# Patient Record
Sex: MALE | Race: BLACK OR AFRICAN AMERICAN
[De-identification: names, ages, dates, MRNs, and addresses within clinical notes are randomized per-mention and may not be internally consistent; named-entity substitution may affect disease eponyms.]

---

## 2021-12-08 NOTE — ED
General Adult HPI





- General


Chief complaint: Recheck/Abnormal Lab/Rx


Stated complaint: Medication Refill


Time Seen by Provider: 12/08/21 15:34


Source: patient, RN notes reviewed


Mode of arrival: ambulatory


Limitations: no limitations





- History of Present Illness


Initial comments: 





66-year-old male history of hypertension, CHF presenting for medication refill. 

Patient has no complaints.  He has been out of his medications for approximately

one week.  The 2 medications that he requires are amlodipine and lisinopril.  He

does take multiple other medications but has a one-month supply of these.  He 

contacted his primary care physician who is in [place] and they indicated that 

he needed to be seen before he could have refills.  He is planning on esta

blishing with a primary care in this area.  He has no other complaints, he is 

requesting a refill of his lisinopril and amlodipine.





- Related Data


                                  Previous Rx's











 Medication  Instructions  Recorded


 


Cyclobenzaprine [Flexeril] 5 mg PO HS #3 tab 12/08/21


 


amLODIPine [Norvasc] 5 mg PO DAILY #30 tab 12/08/21


 


lisinopriL [Zestril] 40 mg PO DAILY #30 tablet 12/08/21











                                    Allergies











Allergy/AdvReac Type Severity Reaction Status Date / Time


 


morphine Allergy  Unknown Verified 12/08/21 15:28














Review of Systems


ROS Statement: 


Those systems with pertinent positive or pertinent negative responses have been 

documented in the HPI.





ROS Other: All systems not noted in ROS Statement are negative.





Past Medical History


Past Medical History: Hypertension


Additional Past Medical History / Comment(s): hypoglycemia, hepatitis C,


History of Any Multi-Drug Resistant Organisms: None Reported


Past Surgical History: Bariatric Surgery


Past Psychological History: No Psychological Hx Reported


Smoking Status: Former smoker


Past Alcohol Use History: Occasional


Past Drug Use History: None Reported





General Exam


Limitations: no limitations


General appearance: alert, in no apparent distress


Head exam: Present: atraumatic, normocephalic


Eye exam: Present: normal appearance, PERRL


ENT exam: Present: normal exam


Neck exam: Present: normal inspection.  Absent: tenderness, meningismus


Respiratory exam: Present: normal lung sounds bilaterally.  Absent: respiratory 

distress, wheezes


Cardiovascular Exam: Present: regular rate, normal rhythm


GI/Abdominal exam: Present: soft.  Absent: distended, tenderness, guarding


Extremities exam: Present: normal inspection, normal capillary refill.  Absent: 

pedal edema


Neurological exam: Present: alert, oriented X3, CN II-XII intact.  Absent: motor

 sensory deficit


Psychiatric exam: Present: normal affect, normal mood


Skin exam: Present: warm, dry, intact.  Absent: cyanosis, diaphoretic





Course


                                   Vital Signs











  12/08/21





  15:28


 


Temperature 99.6 F


 


Pulse Rate 92


 


Respiratory 20





Rate 


 


Blood Pressure 139/61


 


O2 Sat by Pulse 99





Oximetry 














Medical Decision Making





- Medical Decision Making





66-year-old male fell complaints, stable vitals, refilled his medications for 30

 days supply including amlodipine and lisinopril.  Additionally the patient is 

referred to multiple primary care physicians in the area helping him establish





Disposition


Clinical Impression: 


 Encounter for medication refill





Disposition: HOME SELF-CARE


Condition: Good


Instructions (If sedation given, give patient instructions):  Medicine Refill 

(ED)


Prescriptions: 


Cyclobenzaprine [Flexeril] 5 mg PO HS #3 tab


amLODIPine [Norvasc] 5 mg PO DAILY #30 tab


lisinopriL [Zestril] 40 mg PO DAILY #30 tablet


Is patient prescribed a controlled substance at d/c from ED?: No


Referrals: 


None,Stated [Primary Care Provider] - 1-2 days


Torres Hinojosa MD [STAFF PHYSICIAN] - 1-2 days


Lester Barakat MD [REFERRING] - 1-2 days


Davian Payne [STAFF PHYSICIAN] - 1-2 days


Time of Disposition: 15:42

## 2022-02-17 ENCOUNTER — HOSPITAL ENCOUNTER (OUTPATIENT)
Dept: HOSPITAL 47 - LABWHC1 | Age: 67
Discharge: HOME | End: 2022-02-17
Attending: INTERNAL MEDICINE
Payer: MEDICARE

## 2022-02-17 DIAGNOSIS — E78.2: ICD-10-CM

## 2022-02-17 DIAGNOSIS — I50.9: Primary | ICD-10-CM

## 2022-02-17 LAB
ANION GAP SERPL CALC-SCNC: 14.5 MMOL/L (ref 10–18)
BUN SERPL-SCNC: 38.9 MG/DL (ref 9–27)
BUN/CREAT SERPL: 13.41 RATIO (ref 12–20)
CALCIUM SPEC-MCNC: 8.9 MG/DL (ref 8.7–10.3)
CHLORIDE SERPL-SCNC: 100 MMOL/L (ref 96–109)
CHOLEST SERPL-MCNC: 164 MG/DL (ref 0–200)
CO2 SERPL-SCNC: 20.5 MMOL/L (ref 20–27.5)
ERYTHROCYTE [DISTWIDTH] IN BLOOD BY AUTOMATED COUNT: 3.3 X 10*6/UL (ref 4.4–5.6)
ERYTHROCYTE [DISTWIDTH] IN BLOOD: 15.8 % (ref 11.5–14.5)
GLUCOSE SERPL-MCNC: 102 MG/DL (ref 70–110)
HCT VFR BLD AUTO: 28.7 % (ref 39.6–50)
HDLC SERPL-MCNC: 100 MG/DL (ref 40–60)
HGB BLD-MCNC: 8.9 G/DL (ref 13–17)
LDLC SERPL CALC-MCNC: 49.9 MG/DL (ref 0–131)
MCH RBC QN AUTO: 27 PG (ref 27–32)
MCHC RBC AUTO-ENTMCNC: 31 G/DL (ref 32–37)
MCV RBC AUTO: 87 FL (ref 80–97)
NRBC BLD AUTO-RTO: 0 /100 WBCS (ref 0–0)
PLATELET # BLD AUTO: 203 X 10*3/UL (ref 140–440)
POTASSIUM SERPL-SCNC: 4.3 MMOL/L (ref 3.5–5.5)
SODIUM SERPL-SCNC: 135 MMOL/L (ref 135–145)
TRIGL SERPL-MCNC: 70.4 MG/DL (ref 0–149)
VLDLC SERPL CALC-MCNC: 14.08 MG/DL (ref 5–40)
WBC # BLD AUTO: 4.54 X 10*3/UL (ref 4.5–10)

## 2022-02-17 PROCEDURE — 83880 ASSAY OF NATRIURETIC PEPTIDE: CPT

## 2022-02-17 PROCEDURE — 85027 COMPLETE CBC AUTOMATED: CPT

## 2022-02-17 PROCEDURE — 36415 COLL VENOUS BLD VENIPUNCTURE: CPT

## 2022-02-17 PROCEDURE — 84443 ASSAY THYROID STIM HORMONE: CPT

## 2022-02-17 PROCEDURE — 80061 LIPID PANEL: CPT

## 2022-02-17 PROCEDURE — 80048 BASIC METABOLIC PNL TOTAL CA: CPT

## 2022-06-16 ENCOUNTER — HOSPITAL ENCOUNTER (INPATIENT)
Dept: HOSPITAL 47 - EC | Age: 67
LOS: 1 days | Discharge: HOME | DRG: 641 | End: 2022-06-17
Attending: HOSPITALIST | Admitting: HOSPITALIST
Payer: MEDICARE

## 2022-06-16 DIAGNOSIS — Z79.899: ICD-10-CM

## 2022-06-16 DIAGNOSIS — D50.9: ICD-10-CM

## 2022-06-16 DIAGNOSIS — E87.5: Primary | ICD-10-CM

## 2022-06-16 DIAGNOSIS — Z71.6: ICD-10-CM

## 2022-06-16 DIAGNOSIS — N17.9: ICD-10-CM

## 2022-06-16 DIAGNOSIS — N18.4: ICD-10-CM

## 2022-06-16 DIAGNOSIS — E87.1: ICD-10-CM

## 2022-06-16 DIAGNOSIS — Z88.5: ICD-10-CM

## 2022-06-16 DIAGNOSIS — B18.2: ICD-10-CM

## 2022-06-16 DIAGNOSIS — I50.9: ICD-10-CM

## 2022-06-16 DIAGNOSIS — I13.0: ICD-10-CM

## 2022-06-16 DIAGNOSIS — E87.2: ICD-10-CM

## 2022-06-16 LAB
ALBUMIN SERPL-MCNC: 4.2 G/DL (ref 3.5–5)
ALP SERPL-CCNC: 103 U/L (ref 38–126)
ALT SERPL-CCNC: 11 U/L (ref 4–49)
ANION GAP SERPL CALC-SCNC: 5 MMOL/L
AST SERPL-CCNC: 77 U/L (ref 17–59)
BASOPHILS # BLD AUTO: 0 K/UL (ref 0–0.2)
BASOPHILS NFR BLD AUTO: 1 %
BUN SERPL-SCNC: 51 MG/DL (ref 9–20)
CALCIUM SPEC-MCNC: 8.4 MG/DL (ref 8.4–10.2)
CHLORIDE SERPL-SCNC: 112 MMOL/L (ref 98–107)
CO2 SERPL-SCNC: 16 MMOL/L (ref 22–30)
EOSINOPHIL # BLD AUTO: 0.1 K/UL (ref 0–0.7)
EOSINOPHIL NFR BLD AUTO: 2 %
ERYTHROCYTE [DISTWIDTH] IN BLOOD BY AUTOMATED COUNT: 4.14 M/UL (ref 4.3–5.9)
ERYTHROCYTE [DISTWIDTH] IN BLOOD: 15.9 % (ref 11.5–15.5)
GLUCOSE SERPL-MCNC: 88 MG/DL (ref 74–99)
HCT VFR BLD AUTO: 37.4 % (ref 39–53)
HGB BLD-MCNC: 11.4 GM/DL (ref 13–17.5)
LYMPHOCYTES # SPEC AUTO: 1.6 K/UL (ref 1–4.8)
LYMPHOCYTES NFR SPEC AUTO: 33 %
MAGNESIUM SPEC-SCNC: 2.7 MG/DL (ref 1.6–2.3)
MCH RBC QN AUTO: 27.5 PG (ref 25–35)
MCHC RBC AUTO-ENTMCNC: 30.4 G/DL (ref 31–37)
MCV RBC AUTO: 90.4 FL (ref 80–100)
MONOCYTES # BLD AUTO: 0.3 K/UL (ref 0–1)
MONOCYTES NFR BLD AUTO: 5 %
NEUTROPHILS # BLD AUTO: 2.8 K/UL (ref 1.3–7.7)
NEUTROPHILS NFR BLD AUTO: 58 %
PLATELET # BLD AUTO: 197 K/UL (ref 150–450)
POTASSIUM SERPL-SCNC: 6.3 MMOL/L (ref 3.5–5.1)
PROT SERPL-MCNC: 7.4 G/DL (ref 6.3–8.2)
SODIUM SERPL-SCNC: 133 MMOL/L (ref 137–145)
WBC # BLD AUTO: 4.8 K/UL (ref 3.8–10.6)

## 2022-06-16 PROCEDURE — 94640 AIRWAY INHALATION TREATMENT: CPT

## 2022-06-16 PROCEDURE — 96365 THER/PROPH/DIAG IV INF INIT: CPT

## 2022-06-16 PROCEDURE — 84132 ASSAY OF SERUM POTASSIUM: CPT

## 2022-06-16 PROCEDURE — 80053 COMPREHEN METABOLIC PANEL: CPT

## 2022-06-16 PROCEDURE — 85025 COMPLETE CBC W/AUTO DIFF WBC: CPT

## 2022-06-16 PROCEDURE — 36415 COLL VENOUS BLD VENIPUNCTURE: CPT

## 2022-06-16 PROCEDURE — 96375 TX/PRO/DX INJ NEW DRUG ADDON: CPT

## 2022-06-16 PROCEDURE — 83735 ASSAY OF MAGNESIUM: CPT

## 2022-06-16 PROCEDURE — 99285 EMERGENCY DEPT VISIT HI MDM: CPT

## 2022-06-16 PROCEDURE — 51798 US URINE CAPACITY MEASURE: CPT

## 2022-06-16 PROCEDURE — 96366 THER/PROPH/DIAG IV INF ADDON: CPT

## 2022-06-16 PROCEDURE — 93005 ELECTROCARDIOGRAM TRACING: CPT

## 2022-06-16 PROCEDURE — 80048 BASIC METABOLIC PNL TOTAL CA: CPT

## 2022-06-16 NOTE — ED
General Adult HPI





- General


Chief complaint: Recheck/Abnormal Lab/Rx


Stated complaint: Abnormal Labs, Dr CARLEY Carpenter sent pt in


Time Seen by Provider: 06/16/22 18:26


Source: patient


Mode of arrival: ambulatory


Limitations: no limitations





- History of Present Illness


Initial comments: 


Shimon is a pleasant 65yo M with PMH of CHF and CKD. Patient went to Dr Carpenter's

office today to establish care, he was told that his labs were abnormal and he 

was told to come to the hospital.  Patient states that his potassium was high. 

He denies any complaints or symptoms. Has been feeling well. 








- Related Data


                                Home Medications











 Medication  Instructions  Recorded  Confirmed


 


Atorvastatin [Lipitor] 40 mg PO HS 06/16/22 06/16/22


 


Cholecalciferol [Vitamin D3 (125 125 mcg PO DAILY 06/16/22 06/16/22





Mcg = 5000 Iu)]   


 


Cyanocobalamin (Vitamin B-12) 1,000 mcg PO DAILY 06/16/22 06/16/22





[Vitamin B-12]   


 


Ferrous Sulfate [Feosol] 325 mg PO DAILY 06/16/22 06/16/22


 


Furosemide [Lasix] 40 mg PO DAILY 06/16/22 06/16/22


 


Loratadine [Claritin] 10 mg PO DAILY 06/16/22 06/16/22


 


Melatonin [Melatonin ER] 10 mg PO HS 06/16/22 06/16/22


 


Multivit-Min/FA/Lycopen/Lutein 1 tab PO DAILY 06/16/22 06/16/22





[Centrum Silver Men Tablet]   


 


carvediloL [Coreg] 25 mg PO BID-W/MEALS 06/16/22 06/16/22


 


lisinopriL [Zestril] 20 mg PO DAILY 06/16/22 06/16/22








                                  Previous Rx's











 Medication  Instructions  Recorded


 


amLODIPine [Norvasc] 5 mg PO DAILY #30 tab 12/08/21











                                    Allergies











Allergy/AdvReac Type Severity Reaction Status Date / Time


 


morphine Allergy  Unknown Verified 06/16/22 19:44














Review of Systems


ROS Statement: 


Those systems with pertinent positive or pertinent negative responses have been 

documented in the HPI.





ROS Other: All systems not noted in ROS Statement are negative.





Past Medical History


Past Medical History: Heart Failure, Hypertension, Renal Disease


Additional Past Medical History / Comment(s): hypoglycemia, hepatitis C,


History of Any Multi-Drug Resistant Organisms: None Reported


Past Surgical History: Bariatric Surgery


Past Psychological History: No Psychological Hx Reported


Smoking Status: Former smoker


Past Alcohol Use History: Occasional


Past Drug Use History: None Reported





General Exam





- General Exam Comments


Initial Comments: 


Physical Exam


GENERAL:


Patient is well-developed and well-nourished.  


Patient is nontoxic and well-hydrated and is in no distress.





HENT:


Normocephalic, Atraumatic. 





EYES:


PERRL, EOMI





PULMONARY:


Unlabored respirations.  





CARDIOVASCULAR:


RRR


Warm and well perfused extremities





ABDOMEN:


Non-distended





SKIN:


No rashes or bruising 





: 


Deferred





NEUROLOGIC:


Alert and oriented


Normal speech


Normal gait





MUSCULOSKELETAL:


Moving all extremities with no apparent injury 





PSYCHIATRIC:


No SI/HI





Limitations: no limitations





Course


                                   Vital Signs











  06/16/22 06/16/22 06/16/22





  16:14 19:39 20:00


 


Temperature 98.2 F  


 


Pulse Rate 58 L 82 90


 


Respiratory 20  





Rate   


 


Blood Pressure 102/68  


 


O2 Sat by Pulse 98  





Oximetry   














EKG Findings





- EKG Comments:


EKG Findings:: EKG was obtained at 1724, rate is 74 rhythm is sinus, there is a 

leftward axis with evidence of left ventricular hypertrophy, deep T wave 

inversions throughout, T waves appear peaked, no evidence of acute ischemia or 

infarction.





Medical Decision Making





- Medical Decision Making


Labs were obtained


K elevated


Repeat confirmed


Hyperkalemia protocol ordered





Patient to be admitted for CKD with Hyperkalemia 





Dr doe recommends PO Lokelma, IV Sodium Bicarb and repeat labs in 4h





Aury accepts admission to [facility]








- Lab Data


Result diagrams: 


                                 06/16/22 17:31





                                 06/16/22 18:49


                                   Lab Results











  06/16/22 06/16/22 06/16/22 Range/Units





  17:31 17:31 18:49 


 


WBC  4.8    (3.8-10.6)  k/uL


 


RBC  4.14 L    (4.30-5.90)  m/uL


 


Hgb  11.4 L    (13.0-17.5)  gm/dL


 


Hct  37.4 L    (39.0-53.0)  %


 


MCV  90.4    (80.0-100.0)  fL


 


MCH  27.5    (25.0-35.0)  pg


 


MCHC  30.4 L    (31.0-37.0)  g/dL


 


RDW  15.9 H    (11.5-15.5)  %


 


Plt Count  197    (150-450)  k/uL


 


MPV  9.6    


 


Neutrophils %  58    %


 


Lymphocytes %  33    %


 


Monocytes %  5    %


 


Eosinophils %  2    %


 


Basophils %  1    %


 


Neutrophils #  2.8    (1.3-7.7)  k/uL


 


Lymphocytes #  1.6    (1.0-4.8)  k/uL


 


Monocytes #  0.3    (0-1.0)  k/uL


 


Eosinophils #  0.1    (0-0.7)  k/uL


 


Basophils #  0.0    (0-0.2)  k/uL


 


Sodium   133 L   (137-145)  mmol/L


 


Potassium   6.3 H*  6.7 H*  (3.5-5.1)  mmol/L


 


Chloride   112 H   ()  mmol/L


 


Carbon Dioxide   16 L   (22-30)  mmol/L


 


Anion Gap   5   mmol/L


 


BUN   51 H   (9-20)  mg/dL


 


Creatinine   2.68 H   (0.66-1.25)  mg/dL


 


Est GFR (CKD-EPI)AfAm   28   (>60 ml/min/1.73 sqM)  


 


Est GFR (CKD-EPI)NonAf   24   (>60 ml/min/1.73 sqM)  


 


Glucose   88   (74-99)  mg/dL


 


Calcium   8.4   (8.4-10.2)  mg/dL


 


Magnesium   2.7 H   (1.6-2.3)  mg/dL


 


Total Bilirubin   0.1 L   (0.2-1.3)  mg/dL


 


AST   77 H   (17-59)  U/L


 


ALT   11   (4-49)  U/L


 


Alkaline Phosphatase   103   ()  U/L


 


Total Protein   7.4   (6.3-8.2)  g/dL


 


Albumin   4.2   (3.5-5.0)  g/dL














Disposition


Clinical Impression: 


 Hyperkalemia, CKD (chronic kidney disease), CHF (congestive heart failure)





Disposition: ADMITTED AS IP TO THIS HOSP


Condition: Serious


Is patient prescribed a controlled substance at d/c from ED?: No

## 2022-06-17 VITALS — RESPIRATION RATE: 18 BRPM

## 2022-06-17 VITALS — DIASTOLIC BLOOD PRESSURE: 70 MMHG | SYSTOLIC BLOOD PRESSURE: 115 MMHG | HEART RATE: 85 BPM | TEMPERATURE: 98 F

## 2022-06-17 LAB
ANION GAP SERPL CALC-SCNC: 3 MMOL/L
BUN SERPL-SCNC: 42 MG/DL (ref 9–20)
CALCIUM SPEC-MCNC: 8.2 MG/DL (ref 8.4–10.2)
CHLORIDE SERPL-SCNC: 112 MMOL/L (ref 98–107)
CO2 SERPL-SCNC: 20 MMOL/L (ref 22–30)
GLUCOSE SERPL-MCNC: 91 MG/DL (ref 74–99)
POTASSIUM SERPL-SCNC: 4.9 MMOL/L (ref 3.5–5.1)
SODIUM SERPL-SCNC: 135 MMOL/L (ref 137–145)

## 2022-06-17 NOTE — P.HPIM
History of Present Illness


H&P Date: 06/17/22


This is a pleasant 66-year-old male a patient of Dr. Harris with a medical 

history significant for heart failure, hypertension.  He was recently diagnosed 

with chronic kidney disease and has been following with Dr. Carpenter in the office

has just established care.  Outpatient labs showing elevated potassium is 

instructed to come to the .  Upon arrival labs were drawn showing a potassium 

level of 6.7, he did receive cocktail including calcium gluconate, insulin, and 

bicarb.  Potassium has improved down to 4.9.  Additionally patient was started 

on a bicarb drip which ran overnight at 70 mL's per hour.  Patient also admits 

to smoking 2-3 cigarettes per day, previously had quit for the last 6 years.  

Recently started again last couple months.  He does follow with cardiology 

outpatient and he is unsure of the name but states that he had a workup a couple

months ago which included an echocardiogram.  States he was diagnosed with heart

failure 2 years ago.  He has been on lisinopril outpatient which is now 

discontinued.  On discharge he will continue on carvedilol 25 mg twice a day, 

Norvasc 5 mg daily has been added, Lasix 40 mg daily and he will also discharged

on sodium bicarbonate tablets 650 mg daily.  Patient seen in consultation by 

nephrology.


Additional labs showing a sodium level of 133 on admission, potassium 6.3, 

chloride 112, CO2 16, BUN 51, creatinine 2.68, magnesium 2.7, AST 77.





REVIEW OF SYSTEMS: 


CONSTITUTIONAL: No fever, no malaise, no fatigue. 


HEENT: No recent visual problems or hearing problems. Denied any sore throat. 


CARDIOVASCULAR: No chest pain, orthopnea, PND, no palpitations, no syncope. 


PULMONARY: No shortness of breath, no cough, no hemoptysis. 


GASTROINTESTINAL: No diarrhea, no nausea, no vomiting, no abdominal pain. 


NEUROLOGICAL: No headaches, no weakness, no numbness. 


HEMATOLOGICAL: Denies any bleeding or petechiae. 


GENITOURINARY: Denies any burning micturition, frequency, or urgency. 


MUSCULOSKELETAL/RHEUMATOLOGICAL: Denies any joint pain, swelling, or any muscle 

pain. 


ENDOCRINE: Denies any polyuria or polydipsia. 





The rest of the 14-point review of systems is negative.





PHYSICAL EXAMINATION: 





GENERAL: The patient is alert and oriented x3, not in any acute distress. Well 

developed, well nourished. 


HEENT: Pupils are round and equally reacting to light. EOMI. No scleral icterus.

No conjunctival pallor. Normocephalic, atraumatic. No pharyngeal erythema. No 

thyromegaly. 


CARDIOVASCULAR: S1 and S2 present. No murmurs, rubs, or gallops. 


PULMONARY: Chest is clear to auscultation, no wheezing or crackles. 


ABDOMEN: Soft, nontender, nondistended, normoactive bowel sounds. No palpable 

organomegaly. 


MUSCULOSKELETAL: No joint swelling or deformity.


EXTREMITIES: No cyanosis, clubbing, or pedal edema. 


NEUROLOGICAL: Gross neurological examination did not reveal any focal deficits. 


SKIN: No rashes. 





Assessment and plan


Assessment


Acute kidney injury, prerenal.  Patient is not retaining urine bladder scan has 

been completed.  Patient was hydrated overnight and kidney function improved 

down to 2.0 from 2.6.


Hyperkalemia with lisinopril outpatient with newly diagnosis of chronic kidney 

disease, he is instructed to follow low potassium diet outpatient.


Non-anion gap metabolic acidosis associated with chronic kidney disease


Mild hyponatremia possibly hypovolemic is improved with hydration


Patient states history of iron deficiency anemia and is maintained on oral iron 

outpatient


History of heart failure unclear whether systolic or diastolic as echocardiogram

report is not available


History hypertension patient is normotensive


History hepatitis C


History of hypoglycemia


Daily tobacco use counseled on cessation


GI prophylaxis


DVT prophylaxis


Full code





Plan


Bladder scan has been completed ruling out urinary retention


Patient evaluated by nephrology who is recommending low potassium diet 

outpatient and patient is started on oral sodium bicarbon


Lisinopril discontinued on discharge


Patient is educated on low potassium diet


Patient will be discharged and follow up with nephrology in 1 week 


Repeat labs in 2-3 days outpatient





The impression and plan of care has been dictated by Aury Garcia Nurse 

Practitioner as directed.





Dr. Namrata MD


I have performed a history and physical examination and medical decision making 

of this patient, discussed the same with the dictator, and agree with the 

dictators assessment and plan as written, documented as a scribe. Based on total

visit time, I have performed more than 50% of this visit. 








Past Medical History


Past Medical History: Heart Failure, Hypertension, Renal Disease


Additional Past Medical History / Comment(s): hypoglycemia, hepatitis C,


History of Any Multi-Drug Resistant Organisms: None Reported


Past Surgical History: Bariatric Surgery


Past Psychological History: No Psychological Hx Reported


Smoking Status: Former smoker


Past Alcohol Use History: Occasional


Past Drug Use History: None Reported





Medications and Allergies


                                Home Medications











 Medication  Instructions  Recorded  Confirmed  Type


 


amLODIPine [Norvasc] 5 mg PO DAILY #30 tab 12/08/21 06/16/22 Rx


 


Atorvastatin [Lipitor] 40 mg PO HS 06/16/22 06/16/22 History


 


Cholecalciferol [Vitamin D3 (125 125 mcg PO DAILY 06/16/22 06/16/22 History





Mcg = 5000 Iu)]    


 


Cyanocobalamin (Vitamin B-12) 1,000 mcg PO DAILY 06/16/22 06/16/22 History





[Vitamin B-12]    


 


Ferrous Sulfate [Iron (65  mg PO DAILY 06/16/22 06/16/22 History





Elemental)]    


 


Furosemide [Lasix] 40 mg PO DAILY 06/16/22 06/16/22 History


 


Loratadine [Claritin] 10 mg PO DAILY 06/16/22 06/16/22 History


 


Melatonin [Melatonin ER] 10 mg PO HS 06/16/22 06/16/22 History


 


Multivit-Min/FA/Lycopen/Lutein 1 tab PO DAILY 06/16/22 06/16/22 History





[Centrum Silver Men Tablet]    


 


carvediloL [Coreg] 25 mg PO BID-W/MEALS 06/16/22 06/16/22 History


 


Sodium Bicarbonate Tab 650 mg PO DAILY #30 tab 06/17/22  Rx








                                    Allergies











Allergy/AdvReac Type Severity Reaction Status Date / Time


 


morphine Allergy  Unknown Verified 06/16/22 19:44














Physical Exam


Vitals: 


                                   Vital Signs











  Temp Pulse Pulse Resp BP BP Pulse Ox


 


 06/17/22 10:51    96  18   


 


 06/17/22 10:11  98.6 F   96  18   116/72  98


 


 06/17/22 04:05  98.1 F  90   16  112/69   100


 


 06/16/22 22:42   71   18  118/75   98


 


 06/16/22 20:30   82   18  120/69   98


 


 06/16/22 20:00   96   18  110/69  


 


 06/16/22 19:39   82     


 


 06/16/22 19:30   85   17  110/69   97


 


 06/16/22 16:14  98.2 F  58 L   20  102/68   98








                                Intake and Output











 06/16/22 06/17/22 06/17/22





 22:59 06:59 14:59


 


Other:   


 


  Weight 77.111 kg  














Results


CBC & Chem 7: 


                                 06/16/22 17:31





                                 06/17/22 08:03


Labs: 


                  Abnormal Lab Results - Last 24 Hours (Table)











  06/16/22 06/16/22 06/16/22 Range/Units





  17:31 17:31 18:49 


 


RBC  4.14 L    (4.30-5.90)  m/uL


 


Hgb  11.4 L    (13.0-17.5)  gm/dL


 


Hct  37.4 L    (39.0-53.0)  %


 


MCHC  30.4 L    (31.0-37.0)  g/dL


 


RDW  15.9 H    (11.5-15.5)  %


 


Sodium   133 L   (137-145)  mmol/L


 


Potassium   6.3 H*  6.7 H*  (3.5-5.1)  mmol/L


 


Chloride   112 H   ()  mmol/L


 


Carbon Dioxide   16 L   (22-30)  mmol/L


 


BUN   51 H   (9-20)  mg/dL


 


Creatinine   2.68 H   (0.66-1.25)  mg/dL


 


Calcium     (8.4-10.2)  mg/dL


 


Magnesium   2.7 H   (1.6-2.3)  mg/dL


 


Total Bilirubin   0.1 L   (0.2-1.3)  mg/dL


 


AST   77 H   (17-59)  U/L














  06/17/22 Range/Units





  08:03 


 


RBC   (4.30-5.90)  m/uL


 


Hgb   (13.0-17.5)  gm/dL


 


Hct   (39.0-53.0)  %


 


MCHC   (31.0-37.0)  g/dL


 


RDW   (11.5-15.5)  %


 


Sodium  135 L  (137-145)  mmol/L


 


Potassium   (3.5-5.1)  mmol/L


 


Chloride  112 H  ()  mmol/L


 


Carbon Dioxide  20 L  (22-30)  mmol/L


 


BUN  42 H  (9-20)  mg/dL


 


Creatinine  2.09 H  (0.66-1.25)  mg/dL


 


Calcium  8.2 L  (8.4-10.2)  mg/dL


 


Magnesium   (1.6-2.3)  mg/dL


 


Total Bilirubin   (0.2-1.3)  mg/dL


 


AST   (17-59)  U/L














Assessment and Plan


Time with Patient: Less than 30

## 2022-06-17 NOTE — P.DS
Providers


Date of admission: 


06/16/22 19:51





Attending physician: 


Wilbert Lizarraga





Consults: 





                                        





06/16/22 19:51


Consult Physician Stat 


   Consulting Provider: Kelsy Merida


   Consult Reason/Comments: CKD, Hyperkalemia


   Do you want consulting provider notified?: Already Contacted











Primary care physician: 


Roshni Batista





[facility] Course: 


Acute kidney injury, prerenal.  Patient was hydrated overnight and kidney 

function improved down to 2.0 from 2.6.


Hyperkalemia with lisinopril outpatient with newly diagnosis of chronic kidney 

disease, he is instructed to follow low potassium diet outpatient.


Non-anion gap metabolic acidosis associated with chronic kidney disease


Mild hyponatremia possibly hypovolemic is improved with hydration


Patient states history of iron deficiency anemia and is maintained on oral iron 

outpatient


History of heart failure unclear whether systolic or diastolic as echocardiogram

report is not available


History hypertension patient is normotensive


History hepatitis C


History of hypoglycemia


Daily tobacco use counseled on cessation


Full Code





Discharge disposition


Patient is stable for discharge home to follow-up with his PCP 1-2 days, 

recommend follow with nephrology 1 week and repeat labs in 3 days.  Patient is 

given instructions on low potassium diet.





Hospital course


This is a pleasant 66-year-old male with history significant for hypertension, 

heart failure, recent diagnosis of chronic kidney disease.  He presents to the 

hospital elevated potassium is 6.3 which also went up to 6.7.  He follows with 

Dr Carpenter outpatient has recently established care. He was also on lisinopril ou

tpatient. He denies chest pain, denies shortness of breath.  Denies nausea 

vomiting or diarrhea.  He has been urinating without difficulty.  Urinary 

retention has been ruled out.  Patient was given a 1 L fluid bolus in the EC and

started on IV sodium bicarbonate drip at 70 mL's per hour overnight.  Potassium 

has improved down to 4.9 after cocktail, creatinine has also improved to 2.0.  

Patient was evaluated nephrology and he has been cleared for discharge.





Please see medical H&P for additional information


Please see medication reconciliation for list of current medications.  Thank you

for allowing us to participate in the care of this patient.





The impression and plan of care has been dictated by Aury Garcia Nurse 

Practitioner as directed.





Dr. Namrata MD


I have performed a history and physical examination and medical decision making 

of this patient, discussed the same with the dictator, and agree with the 

dictators assessment and plan as written, documented as a scribe. Based on total

visit time, I have performed more than 50% of this visit. 





Patient Condition at Discharge: Good





Plan - Discharge Summary


New Discharge Prescriptions: 


New


   Sodium Bicarbonate Tab 650 mg PO DAILY #30 tab





Continue


   amLODIPine [Norvasc] 5 mg PO DAILY #30 tab


   Melatonin [Melatonin ER] 10 mg PO HS


   Loratadine [Claritin] 10 mg PO DAILY


   Cholecalciferol [Vitamin D3 (125 Mcg = 5000 Iu)] 125 mcg PO DAILY


   Cyanocobalamin (Vitamin B-12) [Vitamin B-12] 1,000 mcg PO DAILY


   Atorvastatin [Lipitor] 40 mg PO HS


   Multivit-Min/FA/Lycopen/Lutein [Centrum Silver Men Tablet] 1 tab PO DAILY


   Furosemide [Lasix] 40 mg PO DAILY


   Ferrous Sulfate [Iron (65 MG Elemental)] 325 mg PO DAILY


   carvediloL [Coreg] 25 mg PO BID-W/MEALS





Discontinued


   lisinopriL [Zestril] 20 mg PO DAILY


Discharge Medication List





amLODIPine [Norvasc] 5 mg PO DAILY #30 tab 12/08/21 [Rx]


Atorvastatin [Lipitor] 40 mg PO HS 06/16/22 [History]


Cholecalciferol [Vitamin D3 (125 Mcg = 5000 Iu)] 125 mcg PO DAILY 06/16/22 

[History]


Cyanocobalamin (Vitamin B-12) [Vitamin B-12] 1,000 mcg PO DAILY 06/16/22 

[History]


Ferrous Sulfate [Iron (65 MG Elemental)] 325 mg PO DAILY 06/16/22 [History]


Furosemide [Lasix] 40 mg PO DAILY 06/16/22 [History]


Loratadine [Claritin] 10 mg PO DAILY 06/16/22 [History]


Melatonin [Melatonin ER] 10 mg PO HS 06/16/22 [History]


Multivit-Min/FA/Lycopen/Lutein [Centrum Silver Men Tablet] 1 tab PO DAILY 

06/16/22 [History]


carvediloL [Coreg] 25 mg PO BID-W/MEALS 06/16/22 [History]


Sodium Bicarbonate Tab 650 mg PO DAILY #30 tab 06/17/22 [Rx]








Follow up Appointment(s)/Referral(s): 


Lester Barakat MD [Primary Care Provider] - 1-2 days


Rashel Carpenter DO [STAFF PHYSICIAN] - 06/27/22 9:00 am


Ambulatory/Diagnostic Orders: 


Basic Metabolic Panel [LAB.AMB] Time Frame: 3 Days, Location: None Selected


Patient Instructions/Handouts:  Potassium Content of Foods List (DC), Chronic 

Kidney Disease Diet (DC)


Activity/Diet/Wound Care/Special Instructions: 


Discontinue lisinopril 


Repeat labs in 2-3 days outpatient


Follow up with Dr. Carpenter in 1 week.








Follow low potassium diet. Information has been provided on diet for chronic 

kidney disease. Information has also been provided on potassium content in food,

 which should be limited.


Discharge Disposition: HOME SELF-CARE

## 2022-06-17 NOTE — P.NPCON
History of Present Illness





- Reason for Consult


hyperkalemia





- History of Present Illness





Patient is a 66-year-old male with history of chronic kidney disease NKF stage 

IV with previous creatinine around 1.8 mg/dL in March 2022.  Patient also has a 

history of hyperkalemia and he was sent in from our office due to abnormal labs.

 Potassium was elevated at 6.7 mg/L.  Patient was also mildly acidotic and was 

placed on bicarb drip.





Patient admits to increased intake of potassium-containing foods recently.  No 

history of GI bleed recently no history of significant constipation.  Patient 

was maintained on small dose of loop diuretics at home.  Next





He received low-calorie mouth and IV treatment for the hyperkalemia.  Line serum

potassium is at 4.9 today.


Serum creatinine improved from 2.6-2.0 g/dL.


Patient denies any difficulty in passing urine.





Review of Systems





As per HPI other systems negative





Past Medical History


Past Medical History: Heart Failure, Hypertension, Renal Disease


Additional Past Medical History / Comment(s): hypoglycemia, hepatitis C,


History of Any Multi-Drug Resistant Organisms: None Reported


Past Surgical History: Bariatric Surgery


Past Psychological History: No Psychological Hx Reported


Smoking Status: Former smoker


Past Alcohol Use History: Occasional


Past Drug Use History: None Reported





Medications and Allergies


                                Home Medications











 Medication  Instructions  Recorded  Confirmed  Type


 


amLODIPine [Norvasc] 5 mg PO DAILY #30 tab 12/08/21 06/16/22 Rx


 


Atorvastatin [Lipitor] 40 mg PO HS 06/16/22 06/16/22 History


 


Cholecalciferol [Vitamin D3 (125 125 mcg PO DAILY 06/16/22 06/16/22 History





Mcg = 5000 Iu)]    


 


Cyanocobalamin (Vitamin B-12) 1,000 mcg PO DAILY 06/16/22 06/16/22 History





[Vitamin B-12]    


 


Ferrous Sulfate [Feosol] 325 mg PO DAILY 06/16/22 06/16/22 History


 


Furosemide [Lasix] 40 mg PO DAILY 06/16/22 06/16/22 History


 


Loratadine [Claritin] 10 mg PO DAILY 06/16/22 06/16/22 History


 


Melatonin [Melatonin ER] 10 mg PO HS 06/16/22 06/16/22 History


 


Multivit-Min/FA/Lycopen/Lutein 1 tab PO DAILY 06/16/22 06/16/22 History





[Centrum Silver Men Tablet]    


 


carvediloL [Coreg] 25 mg PO BID-W/MEALS 06/16/22 06/16/22 History








                                    Allergies











Allergy/AdvReac Type Severity Reaction Status Date / Time


 


morphine Allergy  Unknown Verified 06/16/22 19:44














Physical Exam


Vitals: 


                                   Vital Signs











  Temp Pulse Pulse Resp BP BP Pulse Ox


 


 06/17/22 10:51    96  18   


 


 06/17/22 10:11  98.6 F   96  18   116/72  98


 


 06/17/22 04:05  98.1 F  90   16  112/69   100


 


 06/16/22 22:42   71   18  118/75   98


 


 06/16/22 20:30   82   18  120/69   98


 


 06/16/22 20:00   96   18  110/69  


 


 06/16/22 19:39   82     


 


 06/16/22 19:30   85   17  110/69   97


 


 06/16/22 16:14  98.2 F  58 L   20  102/68   98








                                Intake and Output











 06/16/22 06/17/22 06/17/22





 22:59 06:59 14:59


 


Other:   


 


  Weight 77.111 kg  














Awake, comfortable, not in any acute distress


Alert oriented 3


Examination of the heart S1 and S2


Examination lungs I lateral breath sounds are heard


Abdomen is soft nontender


Examination of the lower extremities shows no evidence of edema


CNS exam grossly intact





Results





- Lab Results


                             Most recent lab results











Calcium  8.2 mg/dL (8.4-10.2)  L  06/17/22  08:03    


 


Magnesium  2.7 mg/dL (1.6-2.3)  H  06/16/22  17:31    














                                 06/16/22 17:31





                                 06/17/22 08:03





Assessment and Plan


Assessment: 





1.  Acute kidney injury, prerenal rule out urine retention.  Renal function 

improved with IV hydration.  I do not see ACE inhibitor's on his med list he may

 have been on it as outpatient we will continue to hold off on the ACE 

inhibitor's for now.


2.  Hyperkalemia associated with acute kidney injury as well as increased intake

 of potassium-containing foods.  This is currently improved.  Rule out urine 

retention


3.  Metabolic acidosis associated with acute kidney injury.  Currently improved


4.  Chronic kidney disease NKF stage IV secondary to nephrosclerosis most l

ikely.  I do not see a UA here.  Patient has been evaluated as outpatient.  We 

will check are office record for UA and ultrasound.


5.  Hypertension currently stable to continue with Coreg and Norvasc and 

continue to hold off on ACE inhibitor's for now


Plan: 





Check bladder scan and rule out urine retention.  He will be given a handout for

 low potassium diet.


Continue off of ACE inhibitor's


Follow-up in the office in 1 week's time next





Patient can be discharged from nephrology standpoint

## 2022-06-23 ENCOUNTER — HOSPITAL ENCOUNTER (OUTPATIENT)
Dept: HOSPITAL 47 - RADUSWWP | Age: 67
Discharge: HOME | End: 2022-06-23
Attending: INTERNAL MEDICINE
Payer: MEDICARE

## 2022-06-23 DIAGNOSIS — N18.32: Primary | ICD-10-CM

## 2022-06-23 PROCEDURE — 76770 US EXAM ABDO BACK WALL COMP: CPT

## 2022-06-23 NOTE — US
EXAMINATION TYPE: US kidneys/renal and bladder

 

DATE OF EXAM: 6/23/2022

 

COMPARISON: NONE

 

CLINICAL HISTORY: N18.32 CKD STAGE 3.

 

EXAM MEASUREMENTS:

 

Right Kidney:  10.1 x 4.8 x 4.8 cm

Left Kidney: 10.4 x 5.1 x 5.0 cm

 

 

 

Right Kidney: small 0.7cm cyst lateral inferior pole  

Left Kidney: 1.1cm cyst mid pole, 2.6cm cyst inferior pole   

Bladder: mildly irregular wall

**Bilateral Jets seen: no

 

 

IMPRESSION:

 

1. Small bilateral renal cysts.

## 2022-09-26 ENCOUNTER — HOSPITAL ENCOUNTER (OUTPATIENT)
Dept: HOSPITAL 47 - LABWHC1 | Age: 67
Discharge: HOME | End: 2022-09-26
Attending: NURSE PRACTITIONER
Payer: MEDICARE

## 2022-09-26 DIAGNOSIS — N25.81: ICD-10-CM

## 2022-09-26 DIAGNOSIS — E55.9: ICD-10-CM

## 2022-09-26 DIAGNOSIS — N18.32: Primary | ICD-10-CM

## 2022-09-26 DIAGNOSIS — M10.9: ICD-10-CM

## 2022-09-26 DIAGNOSIS — N39.0: ICD-10-CM

## 2022-09-26 DIAGNOSIS — R80.9: ICD-10-CM

## 2022-09-26 DIAGNOSIS — D64.9: ICD-10-CM

## 2022-09-26 LAB
ALBUMIN SERPL-MCNC: 4 G/DL (ref 3.8–4.9)
ALBUMIN/GLOB SERPL: 1.74 G/DL (ref 1.6–3.17)
ALP SERPL-CCNC: 71 U/L (ref 41–126)
ALT SERPL-CCNC: 15 U/L (ref 10–49)
ANION GAP SERPL CALC-SCNC: 14.7 MMOL/L (ref 10–18)
AST SERPL-CCNC: 45 U/L (ref 14–35)
BASOPHILS # BLD AUTO: 0.04 X 10*3/UL (ref 0–0.1)
BASOPHILS NFR BLD AUTO: 1.2 %
BUN SERPL-SCNC: 37 MG/DL (ref 9–27)
BUN/CREAT SERPL: 18.97 RATIO (ref 12–20)
CALCIUM SPEC-MCNC: 8.6 MG/DL (ref 8.7–10.3)
CHLORIDE SERPL-SCNC: 103 MMOL/L (ref 96–109)
CO2 SERPL-SCNC: 21.1 MMOL/L (ref 20–27.5)
EOSINOPHIL # BLD AUTO: 0.1 X 10*3/UL (ref 0.04–0.35)
EOSINOPHIL NFR BLD AUTO: 2.9 %
ERYTHROCYTE [DISTWIDTH] IN BLOOD BY AUTOMATED COUNT: 4.28 X 10*6/UL (ref 4.4–5.6)
ERYTHROCYTE [DISTWIDTH] IN BLOOD: 14 % (ref 11.5–14.5)
FERRITIN SERPL-MCNC: 261 NG/ML (ref 22–322)
GLOBULIN SER CALC-MCNC: 2.3 G/DL (ref 1.6–3.3)
GLUCOSE SERPL-MCNC: 69 MG/DL (ref 70–110)
HCT VFR BLD AUTO: 37.1 % (ref 39.6–50)
HGB BLD-MCNC: 12.6 G/DL (ref 13–17)
IMM GRANULOCYTES BLD QL AUTO: 0.3 %
IRON SERPL-MCNC: 142 UG/DL (ref 65–175)
LYMPHOCYTES # SPEC AUTO: 1.61 X 10*3/UL (ref 0.9–5)
LYMPHOCYTES NFR SPEC AUTO: 46.8 %
MAGNESIUM SPEC-SCNC: 2.2 MG/DL (ref 1.5–2.4)
MCH RBC QN AUTO: 29.4 PG (ref 27–32)
MCHC RBC AUTO-ENTMCNC: 34 G/DL (ref 32–37)
MCV RBC AUTO: 86.7 FL (ref 80–97)
MONOCYTES # BLD AUTO: 0.35 X 10*3/UL (ref 0.2–1)
MONOCYTES NFR BLD AUTO: 10.2 %
NEUTROPHILS # BLD AUTO: 1.33 X 10*3/UL (ref 1.8–7.7)
NEUTROPHILS NFR BLD AUTO: 38.6 %
NRBC BLD AUTO-RTO: 0 /100 WBCS (ref 0–0)
PH UR: 5 [PH] (ref 5–8)
PLATELET # BLD AUTO: 196 X 10*3/UL (ref 140–440)
POTASSIUM SERPL-SCNC: 4.9 MMOL/L (ref 3.5–5.5)
PROT SERPL-MCNC: 6.3 G/DL (ref 6.2–8.2)
SODIUM SERPL-SCNC: 138 MMOL/L (ref 135–145)
SP GR UR: 1.01 (ref 1–1.03)
TIBC SERPL-MCNC: 367 UG/DL (ref 228–460)
URATE SERPL-MCNC: 9.9 MG/DL (ref 3.7–8.7)
UROBILINOGEN UR QL STRIP: <2 MG/DL (ref ?–2)
WBC # BLD AUTO: 3.44 X 10*3/UL (ref 4.5–10)

## 2022-09-26 PROCEDURE — 82728 ASSAY OF FERRITIN: CPT

## 2022-09-26 PROCEDURE — 86038 ANTINUCLEAR ANTIBODIES: CPT

## 2022-09-26 PROCEDURE — 82043 UR ALBUMIN QUANTITATIVE: CPT

## 2022-09-26 PROCEDURE — 83540 ASSAY OF IRON: CPT

## 2022-09-26 PROCEDURE — 83735 ASSAY OF MAGNESIUM: CPT

## 2022-09-26 PROCEDURE — 81003 URINALYSIS AUTO W/O SCOPE: CPT

## 2022-09-26 PROCEDURE — 80053 COMPREHEN METABOLIC PANEL: CPT

## 2022-09-26 PROCEDURE — 82306 VITAMIN D 25 HYDROXY: CPT

## 2022-09-26 PROCEDURE — 83550 IRON BINDING TEST: CPT

## 2022-09-26 PROCEDURE — 36415 COLL VENOUS BLD VENIPUNCTURE: CPT

## 2022-09-26 PROCEDURE — 80074 ACUTE HEPATITIS PANEL: CPT

## 2022-09-26 PROCEDURE — 82570 ASSAY OF URINE CREATININE: CPT

## 2022-09-26 PROCEDURE — 83883 ASSAY NEPHELOMETRY NOT SPEC: CPT

## 2022-09-26 PROCEDURE — 85025 COMPLETE CBC W/AUTO DIFF WBC: CPT

## 2022-09-26 PROCEDURE — 84550 ASSAY OF BLOOD/URIC ACID: CPT

## 2022-09-26 PROCEDURE — 83970 ASSAY OF PARATHORMONE: CPT

## 2022-09-26 PROCEDURE — 86334 IMMUNOFIX E-PHORESIS SERUM: CPT
